# Patient Record
Sex: FEMALE | Race: WHITE | NOT HISPANIC OR LATINO | Employment: UNEMPLOYED | URBAN - METROPOLITAN AREA
[De-identification: names, ages, dates, MRNs, and addresses within clinical notes are randomized per-mention and may not be internally consistent; named-entity substitution may affect disease eponyms.]

---
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
## 2025-04-30 ENCOUNTER — OFFICE VISIT (OUTPATIENT)
Dept: OBSTETRICS AND GYNECOLOGY | Facility: CLINIC | Age: 20
End: 2025-04-30
Payer: COMMERCIAL

## 2025-04-30 VITALS
HEIGHT: 64 IN | DIASTOLIC BLOOD PRESSURE: 78 MMHG | BODY MASS INDEX: 21 KG/M2 | WEIGHT: 123 LBS | SYSTOLIC BLOOD PRESSURE: 116 MMHG

## 2025-04-30 DIAGNOSIS — Z30.09 ENCOUNTER FOR COUNSELING REGARDING CONTRACEPTION: Primary | ICD-10-CM

## 2025-04-30 NOTE — PROGRESS NOTES
"        REASON FOR FOLLOWUP/CHIEF COMPLAINT: .  Patient is wanting to discuss bc and look into testing for pcos)      HISTORY OF PRESENT ILLNESS: Patient with regular menses about every 27 days.  She bleeds for about 4 days and the first 2 days are very heavy with significant cramping.  She states that she is not sexually active at this time but does wish to talk about birth control pills to help with cycle control and reducing the bleeding and the cramping.  We discussed use of oral contraceptives and side effects such as nausea, breast tenderness, headache and that these usually get better over time we also discussed possible deep vein thrombosis occurrence.  We also discussed IUD as well as NuvaRing and Nexplanon as options but that the oral contraceptive would likely be the best place to start.  She is in agreement with that.  She does report some coarse hair growth in her lateral cheek area that she uses wax to remove but it is not excessive      Past Medical History, Past Surgical History, Social History, Family History have been reviewed and are without significant changes except as mentioned.    Review of Systems   Review of Systems   Genitourinary:  Positive for menstrual problem. Negative for pelvic pain and vaginal discharge.       Medications:  The current medication list was reviewed in the EMR    ALLERGIES:  No Known Allergies           Vitals:    04/30/25 1334   BP: 116/78   Weight: 55.8 kg (123 lb)   Height: 162.6 cm (64\")     Physical Exam    CONSTITUTIONAL:  Vital signs reviewed.  No distress, looks comfortable.    PSYCHIATRIC:  Normal judgment and insight.  Normal mood and affect.       RECENT LABS:No results found for: \"WBC\", \"HGB\", \"PLT\"    ASSESSMENT/PLAN:  Chiqui Lin [unfilled]   1.  Heavy menstrual bleeding with dysmenorrhea.  Will start with 3 samples of Nextstellis.  She will continue the ibuprofen as needed for her dysmenorrhea and I told her to try to begin it as early as she knows her. "  He is going to start.  I gave her written information about [facility] and I will see her back after 3 cycles of the oral contraceptive and gauge her progress and make changes as needed.